# Patient Record
Sex: MALE | URBAN - METROPOLITAN AREA
[De-identification: names, ages, dates, MRNs, and addresses within clinical notes are randomized per-mention and may not be internally consistent; named-entity substitution may affect disease eponyms.]

---

## 2018-02-07 ENCOUNTER — THERAPY VISIT (OUTPATIENT)
Dept: PHYSICAL THERAPY | Facility: CLINIC | Age: 38
End: 2018-02-07
Payer: COMMERCIAL

## 2018-02-07 DIAGNOSIS — M54.2 CERVICALGIA: Primary | ICD-10-CM

## 2018-02-07 PROCEDURE — 97110 THERAPEUTIC EXERCISES: CPT | Mod: GP | Performed by: PHYSICAL THERAPIST

## 2018-02-07 PROCEDURE — 97112 NEUROMUSCULAR REEDUCATION: CPT | Mod: GP | Performed by: PHYSICAL THERAPIST

## 2018-02-07 PROCEDURE — 97161 PT EVAL LOW COMPLEX 20 MIN: CPT | Mod: GP | Performed by: PHYSICAL THERAPIST

## 2018-02-07 NOTE — PROGRESS NOTES
[facility] Initial Evaluation -- Cervical    Evaluation Date: February 7, 2018  Jaycob Agosto is a 37 year old male with a Cervical condition.   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  Pain quality (sharp/shooting/stabbing/aching/burning/cramping):   shooting.  Paresthesia (yes/no):  no    Present since (onset date): recent exacerbation mid January.     Symptoms (improving/unchanging/worsening):  improving.    Symptoms commenced as a result of: unknonw   Condition occurred in the following environment:  unknonw    Symptoms at onset (neck/arm/forearm/headache): Lt neck  Constant symptoms (neck/arm/forearm/headache):   Intermittent symptoms (neck/arm/forearm/headache): Lt neck    Symptoms are made worse with the following: Sometimes Bending, Always Turning and time of day - No effect, carrying backpack on Lt shld  Symptoms are made better with the following: nothing    Disturbed sleep (yes/no): no  Number of pillows: 1  Sleeping postures (prone/sup/side R/L): sup and sides    Previous episodes (0/1-5/6-10/11+): 10+ Year of first episode: 3-4 yrs ago    Previous history: leaning with head pressing against wall for a prolonged period  Previous treatments: none for neck - has had PT on [facility]    Specific Questions: (as reported by the patient)  Dizziness/Tinnitus/Nausea/Swallowing (pos/neg): neg  Gait/Upper Limbs (normal/abnormal): normal  Medications (nil/NSAIDS/anlag/steroids/anticoag/other):  None  Medical allergies:  nka  General health (excellent/good/fair/poor):  good  Pertinent medical history:  None  Imaging (None/Xray/MRI/Other):  none  Recent or major surgery (yes/no): [facility]  - 2012  Night pain (yes/no): no  Accidents (yes/no): no  Unexplained weight loss (yes/no): no  Barriers at home: none  Other red flags: none    EXAMINATION    Posture:   Sitting (good/fair/poor): good  Standing (good/fair/poor): good - decr kyphosis     Protruded head (yes/no): slight    Wry Neck (right/left/nil):  nil  Relevant (yes/no):       Correction of posture(better/worse/no effect): better  Other observations:  none    Neurological:    Motor Deficit:  Slight decr Lt EPL    Reflexes:  na  Sensory Deficit:  na   Dural signs:  na    Movement Loss:   Arvind Mod Min Nil Pain   Protrusion    X Lt neck   Flexion    X    Retraction    X Lt neck - Deviates Rt   Extension  X   Lt neck - deviaties Rt initially   Lateral flexion R    X    Lateral flexion L  X   Lt neck   Rotation R    X    Rotation L   X  Slight Lt neck - tilts head forward     Test Movements:   During: produces, abolishes, increases, decreases, no effect, centralizing, peripheralizing  After: better, worse, no better, no worse, no effect, centralized, peripheralized    Pretest symptonms sitting: no pain at rest, pain Lt neck w/ rot and SB Lt   Symptoms During Symptoms After ROM increased ROM decreased No Effect   PRO        Rep PRO        RET         W/ OP Produces  Produces  No Worse   NW      Rep RET  W/ OP  Increases  Lt neck and UT  Incr Lt neck and UT    No Worse     NW       X  X   RET EXT Produces    No Worse         Rep RET EXT Produces  Mid range    No Worse     X      If required, pretest symptoms sitting:  No pain at rest    Symptoms During Symptoms After ROM increased ROM decreased No Effect   LF-R        Rep LF-R        LF-L Produces  Lt neck    No Worse         Rep LF-L Produces  Lt neck    No Worse    X         Static Tests:   Protrusion:  incr  Flexion:  na  Retraction:  na  Extension (sitting/prone/supine):  na    Other Tests:    Shld AROM normal, no pain    Provisional Classification:  Derangement - Asymmetrical, unilateral, symptoms  above elbow w/ relevant lateral component    Principle of Management:  Education:  Posture: reviewed neutral spine, using L-support, no couche/recliner/proppingup in bed, work station at home and work, centralisation vs peripheralisation   Equipment provided:  none  Mechanical therapy (Y/N):  yes   Extension principle:     Lateral principle:  Seated SB Lt x10 every 2-3 hours  Flexion principle:     Other:      ASSESSMENT/PLAN:    Patient is a 37 year old male with cervical complaints.    Patient has the following significant findings with corresponding treatment plan.                Diagnosis 1:  Cervicalgia  Pain -  manual therapy, education, directional preference exercise and home program  Decreased ROM/flexibility - manual therapy, therapeutic exercise and home program  Decreased strength - therapeutic exercise, therapeutic activities and home program  Decreased function - therapeutic activities and home program  Impaired posture - neuro re-education and home program    Therapy Evaluation Codes:   1) History comprised of:   Personal factors that impact the plan of care:      None.    Comorbidity factors that impact the plan of care are:      None.     Medications impacting care: None.  2) Examination of Body Systems comprised of:   Body structures and functions that impact the plan of care:      Cervical spine.   Activity limitations that impact the plan of care are:      Bending and truning head to Lt.  3) Clinical presentation characteristics are:   Stable/Uncomplicated.  4) Decision-Making    Low complexity using standardized patient assessment instrument and/or measureable assessment of functional outcome.  Cumulative Therapy Evaluation is: Low complexity.    Previous and current functional limitations:  (See Goal Flow Sheet for this information)    Short term and Long term goals: (See Goal Flow Sheet for this information)     Communication ability:  Patient appears to be able to clearly communicate and  understand verbal and written communication and follow directions correctly.  Treatment Explanation - The following has been discussed with the patient:   RX ordered/plan of care  Anticipated outcomes  Possible risks and side effects  This patient would benefit from PT intervention to resume normal activities.   Rehab potential is good.    Frequency:  1 X week, once daily  Duration:  for 6-8 weeks  Discharge Plan:  Achieve all LTG.  Independent in home treatment program.  Reach maximal therapeutic benefit.    Please refer to the daily flowsheet for treatment today, total treatment time and time spent performing 1:1 timed codes.

## 2018-02-07 NOTE — LETTER
[facility] ATHLETIC [facility] PHYSICAL THER  [address]   [place] [address]  [phone]    2018    Re: Jaycob Agosto   :   1980  MRN:  [id number]   REFERRING PHYSICIAN:   Ciara Aceves    [facility] ATHLETIC [facility] [place] [facility]YS[facility]    Date of Initial Evaluation:  2018  Visits:  Rxs Used: 1  Reason for Referral:  Cervicalgia    EVALUATION SUMMARY    [facility] Initial Evaluation -- Cervical  Evaluation Date: 2018  Jaycob Agosto is a 37 year old male with a Cervical condition.   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  Pain quality (sharp/shooting/stabbing/aching/burning/cramping):   shooting.  Paresthesia (yes/no):  no  Present since (onset date): recent exacerbation mid January.     Symptoms (improving/unchanging/worsening):  improving.  Symptoms commenced as a result of: unknonw   Condition occurred in the following environment:  unknonw  Symptoms at onset (neck/arm/forearm/headache): Lt neck  Constant symptoms (neck/arm/forearm/headache):   Intermittent symptoms (neck/arm/forearm/headache): Lt neck  Symptoms are made worse with the following: Sometimes Bending, Always Turning and time of day - No effect, carrying backpack on Lt shld  Symptoms are made better with the following: nothing  Disturbed sleep (yes/no): no  Number of pillows: 1  Sleeping postures (prone/sup/side R/L): sup and sides  Previous episodes (0/1-5/6-10/11+): 10+ Year of first episode: 3-4 yrs ago  Previous history: leaning with head pressing against wall for a prolonged period  Previous treatments: none for neck - has had PT  on LB    Specific Questions: (as reported by the patient)  Dizziness/Tinnitus/Nausea/Swallowing (pos/neg): neg  Gait/Upper Limbs (normal/abnormal): normal  Medications (nil/NSAIDS/anlag/steroids/anticoag/other):  None  Medical allergies:  nka  General health (excellent/good/fair/poor):  good  Pertinent medical history:  None  Imaging (None/Xray/MRI/Other):  none  Recent or major surgery (yes/no): LB - 2012  Night pain (yes/no): no  Accidents (yes/no): no  Unexplained weight loss (yes/no): no  Barriers at home: none  Other red flags: none    EXAMINATION  Posture:   Sitting (good/fair/poor): good  Standing (good/fair/poor): good - decr kyphosis   Protruded head (yes/no): slight    Wry Neck (right/left/nil):  nil  Relevant (yes/no):     Correction of posture(better/worse/no effect): better  Other observations:  none  Neurological:  Motor Deficit:  Slight decr Lt EPL    Reflexes:  na  Sensory Deficit:  na   Dural signs:  na  Movement Loss:   Arvind Mod Min Nil Pain   Protrusion    X Lt neck   Flexion    X    Retraction    X Lt neck - Deviates Rt   Extension  X   Lt neck - deviaties Rt initially   Lateral flexion R    X    Lateral flexion L  X   Lt neck   Rotation R    X    Rotation L   X  Slight Lt neck - tilts head forward     Test Movements:   During: produces, abolishes, increases, decreases, no effect, centralizing, peripheralizing  After: better, worse, no better, no worse, no effect, centralized, peripheralized    Pretest symptonms sitting: no pain at rest, pain Lt neck w/ rot and SB Lt   Symptoms During Symptoms After ROM increased ROM decreased No Effect   PRO        Rep PRO        RET         W/ OP Produces  Produces  No Worse   NW      Rep RET  W/ OP  Increases  Lt neck and UT  Incr Lt neck and UT    No Worse     NW       X  X   RET EXT Produces    No Worse         Rep RET EXT Produces  Mid range    No Worse     X      If required, pretest symptoms sitting:  No pain at rest    Symptoms During Symptoms After ROM  increased ROM decreased No Effect   LF-R        Rep LF-R        LF-L Produces  Lt neck    No Worse         Rep LF-L Produces  Lt neck    No Worse    X         Static Tests:   Protrusion:  incr  Flexion:  na  Retraction:  na  Extension (sitting/prone/supine):  na  Other Tests:    Shld AROM normal, no pain    Provisional Classification:  Derangement - Asymmetrical, unilateral, symptoms above elbow w/ relevant lateral component  Principle of Management:  Education:  Posture: reviewed neutral spine, using L-support, no couche/recliner/proppingup in bed, work station at home and work, centralisation vs peripheralisation   Equipment provided:  none  Mechanical therapy (Y/N):  yes   Extension principle:     Lateral principle:  Seated SB Lt x10 every 2-3 hours  Flexion principle:     Other:      ASSESSMENT/PLAN:  Patient is a 37 year old male with cervical complaints.    Patient has the following significant findings with corresponding treatment plan.                Diagnosis 1:  Cervicalgia  Pain -  manual therapy, education, directional preference exercise and home program  Decreased ROM/flexibility - manual therapy, therapeutic exercise and home program  Decreased strength - therapeutic exercise, therapeutic activities and home program  Decreased function - therapeutic activities and home program  Impaired posture - neuro re-education and home program  Therapy Evaluation Codes:   1) History comprised of:   Personal factors that impact the plan of care:      None.    Comorbidity factors that impact the plan of care are:      None.     Medications impacting care: None.  2) Examination of Body Systems comprised of:   Body structures and functions that impact the plan of care:      Cervical spine.   Activity limitations that impact the plan of care are:      Bending and truning head to Lt.  3) Clinical presentation characteristics are:   Stable/Uncomplicated.  4) Decision-Making    Low complexity using standardized patient  assessment instrument and/or measureable assessment of functional outcome.  Cumulative Therapy Evaluation is: Low complexity.  Previous and current functional limitations:  (See Goal Flow Sheet for this information)    Short term and Long term goals: (See Goal Flow Sheet for this information)   Communication ability:  Patient appears to be able to clearly communicate and understand verbal and written communication and follow directions correctly.  Treatment Explanation - The following has been discussed with the patient:   RX ordered/plan of care  Anticipated outcomes  Possible risks and side effects  This patient would benefit from PT intervention to resume normal activities.   Rehab potential is good.  Frequency:  1 X week, once daily  Duration:  for 6-8 weeks  Discharge Plan:  Achieve all LTG.  Independent in home treatment program.  Reach maximal therapeutic benefit.    Thank you for your referral.    INQUIRIES  Therapist: Karla Candelario, PT   [facility] [place] [facility]  [address] [facility] [address]   [place] [address]  Phone: [phone]  Fax: [phone]

## 2018-02-14 ENCOUNTER — THERAPY VISIT (OUTPATIENT)
Dept: PHYSICAL THERAPY | Facility: CLINIC | Age: 38
End: 2018-02-14
Payer: COMMERCIAL

## 2018-02-14 DIAGNOSIS — M54.2 CERVICALGIA: ICD-10-CM

## 2018-02-14 PROCEDURE — 97110 THERAPEUTIC EXERCISES: CPT | Mod: GP | Performed by: PHYSICAL THERAPY ASSISTANT

## 2018-02-14 PROCEDURE — 97530 THERAPEUTIC ACTIVITIES: CPT | Mod: GP | Performed by: PHYSICAL THERAPY ASSISTANT

## 2018-02-21 ENCOUNTER — THERAPY VISIT (OUTPATIENT)
Dept: PHYSICAL THERAPY | Facility: CLINIC | Age: 38
End: 2018-02-21
Payer: COMMERCIAL

## 2018-02-21 DIAGNOSIS — M54.2 CERVICALGIA: ICD-10-CM

## 2018-02-21 PROCEDURE — 97112 NEUROMUSCULAR REEDUCATION: CPT | Mod: GP | Performed by: PHYSICAL THERAPIST

## 2018-02-21 PROCEDURE — 97110 THERAPEUTIC EXERCISES: CPT | Mod: GP | Performed by: PHYSICAL THERAPIST
